# Patient Record
Sex: FEMALE | Race: WHITE | NOT HISPANIC OR LATINO | URBAN - METROPOLITAN AREA
[De-identification: names, ages, dates, MRNs, and addresses within clinical notes are randomized per-mention and may not be internally consistent; named-entity substitution may affect disease eponyms.]

---

## 2023-07-23 ENCOUNTER — EMERGENCY (EMERGENCY)
Facility: HOSPITAL | Age: 23
LOS: 1 days | Discharge: HOME | End: 2023-07-23
Attending: EMERGENCY MEDICINE | Admitting: EMERGENCY MEDICINE
Payer: COMMERCIAL

## 2023-07-23 VITALS
DIASTOLIC BLOOD PRESSURE: 89 MMHG | WEIGHT: 179.9 LBS | TEMPERATURE: 99 F | SYSTOLIC BLOOD PRESSURE: 144 MMHG | HEIGHT: 70 IN | OXYGEN SATURATION: 98 % | HEART RATE: 108 BPM | RESPIRATION RATE: 18 BRPM

## 2023-07-23 VITALS
DIASTOLIC BLOOD PRESSURE: 72 MMHG | TEMPERATURE: 98 F | SYSTOLIC BLOOD PRESSURE: 107 MMHG | OXYGEN SATURATION: 100 % | RESPIRATION RATE: 16 BRPM | HEART RATE: 70 BPM

## 2023-07-23 DIAGNOSIS — Z87.892 PERSONAL HISTORY OF ANAPHYLAXIS: ICD-10-CM

## 2023-07-23 DIAGNOSIS — R07.0 PAIN IN THROAT: ICD-10-CM

## 2023-07-23 DIAGNOSIS — Z91.018 ALLERGY TO OTHER FOODS: ICD-10-CM

## 2023-07-23 DIAGNOSIS — Z88.0 ALLERGY STATUS TO PENICILLIN: ICD-10-CM

## 2023-07-23 PROCEDURE — 99282 EMERGENCY DEPT VISIT SF MDM: CPT

## 2023-07-23 PROCEDURE — 99284 EMERGENCY DEPT VISIT MOD MDM: CPT

## 2023-07-23 RX ORDER — FAMOTIDINE 10 MG/ML
20 INJECTION INTRAVENOUS DAILY
Refills: 0 | Status: DISCONTINUED | OUTPATIENT
Start: 2023-07-23 | End: 2023-07-27

## 2023-07-23 RX ADMIN — FAMOTIDINE 20 MILLIGRAM(S): 10 INJECTION INTRAVENOUS at 21:02

## 2023-07-23 NOTE — ED PROVIDER NOTE - CLINICAL SUMMARY MEDICAL DECISION MAKING FREE TEXT BOX
hx obtained from pt and friend. avss. nontoxic. NAD. no systemic sx. isolated itchy throat s/p potential ingestion of pine nuts. no s/s consistent w/ anaphylaxis. s/p benadryl 50 pta. s/p pepcid upon arrival. no change upon reassessment. airway remains intact. sx finally resolved on subsequent reassessment. airway remains intact. NO indication for epi. lengthy d/w pt re: steroids. understands risks/benefits. deferring at this time. feels safe going home w/ friend. already has epi pen. likely mild allergic reaction. strict return precautions. questions answered.

## 2023-07-23 NOTE — ED PROVIDER NOTE - NSFOLLOWUPINSTRUCTIONS_ED_ALL_ED_FT
BENADRYL AS NEEDED FOR ITCHING/RASH. EPI PEN IF EXPERIENCE ITCHY RASH AND OTHER SYMPTOM: HEADACHE, DIZZINESS, FACIAL RASH/SWELLING, THROAT CLOSING SENSATION, DROOLING, VOICE CHANGES, STRIDOR, WHEEZING, CHEST PAIN, SHORTNESS OF BREATH, ABDOMINAL PAIN, VOMITING. IF EPI PEN IS USED OR OTHER CONCERNING SYMPTOM, RETURN TO THE EMERGENCY DEPARTMENT IMMEDIATELY.    PLEASE FOLLOW-UP WITH YOUR PCP IN 1-2 DAYS AS NEEDED.    PLEASE CONTACT JEANNINE JORDAN ([facility] EMERGENCY DEPARTMENT CLINICAL REFERRAL COORDINATOR) TO ASSIST IN SCHEDULING YOUR FOLLOW-UP APPOINTMENT.    Monday - Friday 11am-7pm  [phone]  [email or web address]

## 2023-07-23 NOTE — ED ADULT TRIAGE NOTE - CHIEF COMPLAINT QUOTE
I'm having allergic reaction from pine nuts, ate something 1 hour ago; my throat feels scratchy, hard to swallow, I feel like there's a lump in my throat; took Benadryl 2 pills 30 min ago; denies sob, no rash or itching

## 2023-07-23 NOTE — ED PROVIDER NOTE - OBJECTIVE STATEMENT
22F anaphylaxis to ?unclear agent req epi (2009), subsequently found to be allergic pine nuts (?unclear if anaphylaxis), now c/o "itchy lump in throat" starting ~30min after potentially ingesting pine nuts in cookies ~1.5h pta. s/p total benadryl 50 po pta. no ha/dizziness, no drooling, no choking, no voice changes, no facial swelling, no stridor, no cp/sob, no wheezing, no abd pain/n/v, NO rash, no trauma.

## 2023-07-23 NOTE — ED ADULT NURSE NOTE - OBJECTIVE STATEMENT
Pt is a+ox4 c/o allergic reaction. Pt states she ate pine nuts with a known allergy. Took 2 benadryl pta. c/o throat itching and feeling a lump in throat. resp even and unlabored

## 2023-07-23 NOTE — ED PROVIDER NOTE - PROGRESS NOTE DETAILS
no change upon reassessment. airway remains intact. sx resolved. airway remains intact. NO indication for epi. lengthy d/w pt re: steroids. understands risks/benefits. deferring at this time. feels safe going home w/ friend. already has epi pen. strict return precautions.

## 2023-07-23 NOTE — ED ADULT TRIAGE NOTE - BANDS:
- daily allergy medicine (zyrtec, Claritin, allegra)  - nightly steroid nose spray (flonase, fluticasone)   - saline sinus rinses a few times per week (Neilmed, Netipot)  
Allergy;

## 2023-07-23 NOTE — ED PROVIDER NOTE - PATIENT PORTAL LINK FT
You can access the FollowMyHealth Patient Portal offered by [facility] by registering at the following website: [email or web address]. By joining [email or web address] FollowMyHealth portal, you will also be able to view your health information using other applications (apps) compatible with our system.

## 2025-01-08 ENCOUNTER — APPOINTMENT (OUTPATIENT)
Dept: OBSTETRICS AND GYNECOLOGY | Facility: CLINIC | Age: 25
End: 2025-01-08
Payer: COMMERCIAL

## 2025-01-22 ENCOUNTER — APPOINTMENT (OUTPATIENT)
Dept: OBSTETRICS AND GYNECOLOGY | Facility: CLINIC | Age: 25
End: 2025-01-22
Payer: COMMERCIAL